# Patient Record
Sex: FEMALE | Race: WHITE | NOT HISPANIC OR LATINO
[De-identification: names, ages, dates, MRNs, and addresses within clinical notes are randomized per-mention and may not be internally consistent; named-entity substitution may affect disease eponyms.]

---

## 2019-03-29 PROBLEM — Z00.00 ENCOUNTER FOR PREVENTIVE HEALTH EXAMINATION: Status: ACTIVE | Noted: 2019-03-29

## 2019-04-01 ENCOUNTER — APPOINTMENT (OUTPATIENT)
Dept: OTOLARYNGOLOGY | Facility: CLINIC | Age: 44
End: 2019-04-01
Payer: COMMERCIAL

## 2019-04-01 VITALS
DIASTOLIC BLOOD PRESSURE: 80 MMHG | HEIGHT: 64 IN | SYSTOLIC BLOOD PRESSURE: 130 MMHG | WEIGHT: 130 LBS | HEART RATE: 75 BPM | BODY MASS INDEX: 22.2 KG/M2

## 2019-04-01 DIAGNOSIS — Z87.891 PERSONAL HISTORY OF NICOTINE DEPENDENCE: ICD-10-CM

## 2019-04-01 DIAGNOSIS — Z86.39 PERSONAL HISTORY OF OTHER ENDOCRINE, NUTRITIONAL AND METABOLIC DISEASE: ICD-10-CM

## 2019-04-01 DIAGNOSIS — Z91.09 OTHER ALLERGY STATUS, OTHER THAN TO DRUGS AND BIOLOGICAL SUBSTANCES: ICD-10-CM

## 2019-04-01 DIAGNOSIS — Z87.19 PERSONAL HISTORY OF OTHER DISEASES OF THE DIGESTIVE SYSTEM: ICD-10-CM

## 2019-04-01 DIAGNOSIS — Z98.890 OTHER SPECIFIED POSTPROCEDURAL STATES: ICD-10-CM

## 2019-04-01 DIAGNOSIS — Z78.9 OTHER SPECIFIED HEALTH STATUS: ICD-10-CM

## 2019-04-01 DIAGNOSIS — Z85.850 PERSONAL HISTORY OF MALIGNANT NEOPLASM OF THYROID: ICD-10-CM

## 2019-04-01 PROCEDURE — 99024 POSTOP FOLLOW-UP VISIT: CPT

## 2019-05-29 ENCOUNTER — APPOINTMENT (OUTPATIENT)
Dept: OTOLARYNGOLOGY | Facility: CLINIC | Age: 44
End: 2019-05-29
Payer: COMMERCIAL

## 2019-05-29 DIAGNOSIS — Z87.09 PERSONAL HISTORY OF OTHER DISEASES OF THE RESPIRATORY SYSTEM: ICD-10-CM

## 2019-05-29 PROCEDURE — 99213 OFFICE O/P EST LOW 20 MIN: CPT

## 2019-05-29 NOTE — REVIEW OF SYSTEMS
[Post Nasal Drip] : post nasal drip [Sneezing] : sneezing [Recurrent Sinus Infections] : recurrent sinus infections [Throat Clearing] : throat clearing [Cough] : cough [Negative] : Respiratory

## 2019-05-29 NOTE — HISTORY OF PRESENT ILLNESS
[de-identified] : TEO SEXTON is a 44 year old female who comes in complaining of 3 days of a cough and mucous in her throat and a sore throat and nasal congestion without fever. She has a history of sinusitis and environmental allergies. She is using antihistamines and Flonase for the latter.The patient had no other ear nose or throat complaints at this visit.

## 2019-05-29 NOTE — PHYSICAL EXAM
[FreeTextEntry1] : General:\par The patient was alert and oriented and in no distress.\par Voice was clear. She had intermittent brassy cough\par \par Ears:\par The external ears were normal without deformity.\par The ear canals were clear.\par The tympanic membranes were intact and normal.\par \par Neck: \par The neck was symmetrical.\par The parotid and submandibular glands were normal without masses.\par The trachea was midline and there was no unusual crepitus.\par The thyroid was smooth and nontender and no masses were palpated.\par There was no significant cervical adenopathy.\par \par Face:\par The patient had no facial asymmetry or mass.\par The skin was unremarkable.\par \par Oral cavity:\par The oral mucosa was normal.\par The oral and base of tongue were clear and without mass.\par The gingival and buccal mucosa were moist and without lesions.\par The palate moved well.\par There was no cleft to the palate.\par There appeared to be good salivary flow.  \par There was no pus, erythema or mass in the oral cavity.\par The posterior pharynx was inflamed and a rapid strep and regular throat culture were taken.\par \par The rapid strep was negative\par \par \par Nasal endoscopy:\par \par Nasal endoscopy was done with topical anesthesia and a flexible endoscope to evaluate for nasal polyps, chronic sinusitis and response to therapy.\par Evaluation showed that the septum was midline.  There was no significant mucosal disease.  The inferior turbinates and middle turbinates were normal.  On both sides, the surgically opened ethmoids, antra, and frontal recesses were clear.  There was no evidence of polypoid recurrences and no purulence.  The mucosa was close to stage zero.  The nasopharynx was benign.    The middle and  superior meati were normal and the sphenoethmoidal recesses were without evidence of disease\par There was inflammation of the nasal and sinus mucosa.\par \par Flexible fiberoptic laryngoscopy: [facility] [id number]\par Indications: Dysphagia\par Procedure note:\par \par Flexible fiberoptic laryngoscopy was performed because of dysphagia and because of the patient's inability to tolerate adequate mirror examination.\par \par The nasal cavity was anesthetized with Pontocaine and Afrin.\par The flexible endoscope was placed into the patient's nasal cavity.\par The nasopharynx was without masses.\par The oropharynx and base of tongue had no masses.\par The epiglottis and false vocal cords were normal.\par The pyriform sinuses were without mucosal lesions or pooling of secretions.  \par The lateral pharyngeal walls were clear and symmetrical.\par The vocal folds were clear and mobile; they abducted and adducted normally.\par There was no interarytenoid mass or fullness.\par \par There was posterior pharyngeal inflammation extending to the larynx without supraglottitis

## 2019-05-29 NOTE — ASSESSMENT
[FreeTextEntry1] : It was my impression that she has a probable acute viral upper respiratory tract infection. I recommend guaifenesin for the thick mucus and supportive care. The rapid strep was negative and I will send out the permanent culture.  I would treat if she develops a secondary bacterial infection or if the culture so indicates.

## 2019-06-27 ENCOUNTER — APPOINTMENT (OUTPATIENT)
Dept: OTOLARYNGOLOGY | Facility: CLINIC | Age: 44
End: 2019-06-27
Payer: COMMERCIAL

## 2019-06-27 PROCEDURE — 99212 OFFICE O/P EST SF 10 MIN: CPT

## 2019-06-27 RX ORDER — LEVOTHYROXINE SODIUM 137 UG/1
137 TABLET ORAL
Refills: 0 | Status: ACTIVE | COMMUNITY

## 2019-06-27 RX ORDER — LORATADINE 5 MG/5 ML
10 SOLUTION, ORAL ORAL
Refills: 0 | Status: ACTIVE | COMMUNITY

## 2019-06-27 RX ORDER — LANSOPRAZOLE 15 MG/1
15 CAPSULE, DELAYED RELEASE ORAL
Refills: 0 | Status: ACTIVE | COMMUNITY

## 2019-06-27 NOTE — HISTORY OF PRESENT ILLNESS
[de-identified] : Status post 2 Kenalog injections to the nasal tip scarring after nasal fracture repair and nasal valve repair. She still sees very slight fullness.  Her breathing is significantly improved. She is requesting another Kenalog injection

## 2019-06-27 NOTE — ASSESSMENT
[FreeTextEntry1] : Did well with Kenalog injection in the past. The fullness is extremely minimal at this point. This will likely be are last injection. Follow up as needed

## 2019-10-29 ENCOUNTER — APPOINTMENT (OUTPATIENT)
Dept: OTOLARYNGOLOGY | Facility: CLINIC | Age: 44
End: 2019-10-29
Payer: COMMERCIAL

## 2019-10-29 VITALS
SYSTOLIC BLOOD PRESSURE: 138 MMHG | HEART RATE: 76 BPM | WEIGHT: 130 LBS | HEIGHT: 64 IN | DIASTOLIC BLOOD PRESSURE: 94 MMHG | BODY MASS INDEX: 22.2 KG/M2

## 2019-10-29 PROCEDURE — 99213 OFFICE O/P EST LOW 20 MIN: CPT | Mod: 25

## 2019-10-29 PROCEDURE — 31231 NASAL ENDOSCOPY DX: CPT

## 2019-10-29 NOTE — ASSESSMENT
[FreeTextEntry1] : It was my impression that she has the persistent rhinitis and inferior turbinate hypertrophy through appropriate medical and allergy care. I suggested 4 days of prednisone at 20 mg and switching to budesonide rinses from the Flonase. I was asked to see her back in followup in a month and if not improving would  consider inferior turbinate reduction. She had this done in the past with good but not permanent the results.\par She wanted to ask Dr. Weiss about retesting for her allergies\par She still has laryngeal and nasopharyngeal evidence of reflux, but I did not increase her medical intervention, other than being careful of her diet.

## 2019-10-29 NOTE — PHYSICAL EXAM
[FreeTextEntry1] : \par The patient was alert and oriented and in no distress.\par Voice was clear.\par \par Face:\par The patient had no facial asymmetry or mass.\par The skin was unremarkable.\par \par Eyes:\par The pupils were equal round and reactive to light and accommodation.\par There was no significant nystagmus or disconjugate gaze noted.\par \par Nose: \par The external nose had no significant deformity.  There was no facial tenderness.  On anterior rhinoscopy, the nasal mucosa was clear.  The anterior septum was midline.  There were no visualized polyps purulence  or masses.\par \par Oral cavity:\par The oral mucosa was normal.\par The oral and base of tongue were clear and without mass.\par The gingival and buccal mucosa were moist and without lesions.\par The palate moved well.\par There was no cleft to the palate.\par There appeared to be good salivary flow.  \par There was no pus, erythema or mass in the oral cavity.\par \par \par Ears:\par The external ears were normal without deformity.\par The ear canals were clear.\par The tympanic membranes were intact and normal.\par \par Neck: \par The neck was symmetrical.\par The parotid and submandibular glands were normal without masses.\par The trachea was midline and there was no unusual crepitus.\par The thyroid was smooth and nontender and no masses were palpated.\par There was no significant cervical adenopathy.\par \par \par Neuro:\par Neurologically, the patient was awake, alert, and oriented to person, place and time. There were no obvious focal neurologic abnormalities.  Cranial nerves II through XII were grossly intact.\par \par \par TMJ:\par The temporomandibular joints were nontender.\par There was no abnormal crepitus and no significant malocclusion\par  [de-identified] : Nasal endoscopy: \par CPT 63793\par Procedure Note:\par \par Nasal endoscopy was done with topical anesthesia of Pontocaine and Afrin and a      nasal endoscope.\par Indication: Nasal congestion, rule out sinusitis.\par Procedure: The nasal cavity was anesthetized with topical Afrin and Pontocaine. An  endoscope was used and inserted into the nasal cavity.\par Attention was first paid to the anterior nasal cavity.\par Endocoscopy was performed to inspect the interior of the nasal cavity, the nasal septum,  the middle and superior meati, the inferior, middle and superior turbinates, and the spheno-ethmoidal  recesses, the nasopharynx and eustachian tube orifices bilaterally. \par All findings were normal except:\par She had moderate hypertrophy of the right inferior turbinate and the middle meatus was clear without polyps purulence or masses.\par She appeared to have a large or bifid left middle turbinates without polyps purulence or masses. She had significant left-sided inferior turbinate hypertrophy with the inferior turbinate abutting the septum posteriorly.\par \par Flexible fiberoptic laryngoscopy: CPT 22258\par Indications: Dysphagia\par Procedure note:\par \par Flexible fiberoptic laryngoscopy was performed because of dysphagia and because of the patient's inability to tolerate adequate mirror examination.\par \par The nasal cavity was anesthetized with Pontocaine and Afrin.\par The flexible endoscope was placed into the patient's nasal cavity.\par The nasopharynx was without masses.\par The oropharynx, vallecula and base of tongue had no masses.\par The epiglottis, aryepiglottic folds and false vocal cords were normal.\par The pyriform sinuses were without mucosal lesions or pooling of secretions.  \par The laryngeal ventricles were without lesions.\par The visualized subglottis was without mass.\par The lateral and posterior pharyngeal walls were clear and symmetrical.\par The vocal folds were clear and mobile; they abducted and adducted normally.\par There was no interarytenoid mass or fullness.\par There was moderate and a laryngeal inflammation and inflammation of the nasopharynx\par

## 2019-10-29 NOTE — CONSULT LETTER
[FreeTextEntry2] : Buster Weiss MD [FreeTextEntry1] : \par \par Dear  Buster\par \par I had the pleasure of seeing your patient today.  \par Please see my note below.\par \par \par Thank you very much for allowing me to participate in the care of your patient.\par \par Sincerely,\par Lane\par \par Shant Rachel MD\par [facility]\par [facility]\par  [facility]\par \par

## 2019-10-29 NOTE — HISTORY OF PRESENT ILLNESS
[de-identified] : TEO SEXTON is a 44 year old female who comes in complaining of sinus pressure and a possible sinus infection. She notes that she has been feeling tightness in her nasal cavity for a few weeks and pressure over her forehead.  She had a sinus infection over the summer.  She has been getting desensitization. She is using Prevacid Flonase and Claritin. She still has some throat clearing. She has not had purulence.The patient had no other ear nose or throat complaints at this visit.

## 2019-12-30 ENCOUNTER — APPOINTMENT (OUTPATIENT)
Dept: OTOLARYNGOLOGY | Facility: CLINIC | Age: 44
End: 2019-12-30
Payer: COMMERCIAL

## 2019-12-30 PROCEDURE — 99213 OFFICE O/P EST LOW 20 MIN: CPT

## 2019-12-30 NOTE — HISTORY OF PRESENT ILLNESS
[de-identified] : Status post 3 Kenalog injections to the nasal tip scarring after nasal fracture repair and nasal valve repair, last in June 2019.  She still sees very slight fullness.  Her breathing is significantly improved, and she is also on dupixent. She is requesting another Kenalog injection

## 2020-09-16 ENCOUNTER — TRANSCRIPTION ENCOUNTER (OUTPATIENT)
Age: 45
End: 2020-09-16

## 2020-09-16 ENCOUNTER — APPOINTMENT (OUTPATIENT)
Dept: OTOLARYNGOLOGY | Facility: CLINIC | Age: 45
End: 2020-09-16
Payer: COMMERCIAL

## 2020-09-16 VITALS — WEIGHT: 140 LBS | HEIGHT: 64 IN | TEMPERATURE: 98.1 F | BODY MASS INDEX: 23.9 KG/M2

## 2020-09-16 PROCEDURE — 99213 OFFICE O/P EST LOW 20 MIN: CPT | Mod: 25

## 2020-09-16 PROCEDURE — 31231 NASAL ENDOSCOPY DX: CPT

## 2020-09-16 RX ORDER — PREDNISONE 20 MG/1
20 TABLET ORAL DAILY
Qty: 4 | Refills: 0 | Status: DISCONTINUED | COMMUNITY
Start: 2019-10-29 | End: 2020-09-16

## 2020-09-16 NOTE — ASSESSMENT
[FreeTextEntry1] : It was my impression that her nasal congestion was likely multifactorial.\par Unfortunately, her allergy care was interrupted and I suggested going back to this.\par She has inferior turbinate hypertrophy and did well for quite a few years from inferior turbinate reductions. However, some of her congestion seems likely from the nasal valve as well.\par If she does not do well enough, I would recommend either inferior turbinate reductions or inferior turbinate reductions combined with vivaer to treat the nasal valve \par She has a positive Rc on the left\par Her sinuses look excellent. There is no evidence of recurrent polypoid disease\par Some of the irritation may be from the nasal steroid spray and I suggested switching from Flonase to a water based spray

## 2020-09-16 NOTE — CONSULT LETTER
[FreeTextEntry2] : Buster Weiss MD [FreeTextEntry1] : \par \par Dear  Buster\par \par I had the pleasure of seeing your patient today.  \par Please see my note below.\par \par \par Thank you very much for allowing me to participate in the care of your patient.\par \par I hope this note finds you and your family are well.   \par \par Sincerely,\par Laen\par \par Shant Rachel MD\par [facility]\par [facility]\par  [facility]\par \par

## 2020-09-16 NOTE — HISTORY OF PRESENT ILLNESS
[de-identified] : TEO SEXTON is a 45 year old female who comes in complaining of Increased nasal congestion. This is worse on the left side and she was told that she has inferior turbinate hypertrophy. Her allergy care has been interrupted and she feels this may be part of the problem. She denies any significant purulence but the tissue feels sort of angry. She had used Dupixent in the past for some time but this also was interrupted as her allergy shots.The patient had no other ear nose or throat complaints at this visit.

## 2020-09-16 NOTE — PHYSICAL EXAM
[FreeTextEntry1] : \par The patient was alert and oriented and in no distress.\par Voice was clear.\par \par Face:\par The patient had no facial asymmetry or mass.\par The skin was unremarkable.\par \par Eyes:\par The pupils were equal round and reactive to light and accommodation.\par There was no significant nystagmus or disconjugate gaze noted.\par \par Nose: \par The external nose had no significant deformity.  There was no facial tenderness.  On anterior rhinoscopy, the nasal mucosa was clear.  The anterior septum was midline.  \par \par Oral cavity:\par The oral mucosa was normal.\par The oral and base of tongue were clear and without mass.\par The gingival and buccal mucosa were moist and without lesions.\par The palate moved well.\par There was no cleft to the palate.\par There appeared to be good salivary flow.  \par There was no pus, erythema or mass in the oral cavity.\par \par \par Ears:\par The external ears were normal without deformity.\par The ear canals were clear.\par The tympanic membranes were intact and normal.\par \par Neck: \par The neck was symmetrical.\par The parotid and submandibular glands were normal without masses.\par The trachea was midline and there was no unusual crepitus.\par The thyroid was smooth and nontender and no masses were palpated.\par There was no significant cervical adenopathy.\par \par \par Neuro:\par Neurologically, the patient was awake, alert, and oriented to person, place and time. There were no obvious focal neurologic abnormalities.  Cranial nerves II through XII were grossly intact.\par \par \par TMJ:\par The temporomandibular joints were nontender.\par There was no abnormal crepitus and no significant malocclusion\par \par  [de-identified] : Nasal endoscopy:\par \par Nasal endoscopy was done with topical anesthesia and a flexible endoscope to evaluate for nasal polyps, chronic sinusitis and response to therapy.\par Endocoscopy was performed to inspect the interior of the nasal cavity, the nasal septum,  the middle and superior meati, the inferior, middle and superior turbinates, and the spheno-ethmoidal  recesses, the nasopharynx and eustachian tube orifices bilaterally\par \par Endoscopy was done with Covid precautions and with video. All risks and benefits were discussed with the patient and consent obtained.\par \par \par Evaluation showed that the septum was midline.  There was no significant mucosal disease.  The inferior turbinates and middle turbinates were normal.  On both sides, the surgically opened ethmoids, antra, and frontal recesses were clear.  There was no evidence of polypoid recurrences and no purulence.  The mucosa was close to stage zero.  The nasopharynx was benign.    The middle and  superior meati were normal and the sphenoethmoidal recesses were without evidence of disease\par \par She had boggy inferior turbinates bilaterally and fullness of the nasal valve more on the left than right with positive Rc maneuver

## 2020-09-26 ENCOUNTER — LABORATORY RESULT (OUTPATIENT)
Age: 45
End: 2020-09-26

## 2020-09-30 ENCOUNTER — APPOINTMENT (OUTPATIENT)
Dept: OTOLARYNGOLOGY | Facility: CLINIC | Age: 45
End: 2020-09-30
Payer: COMMERCIAL

## 2020-09-30 VITALS — WEIGHT: 140 LBS | BODY MASS INDEX: 23.9 KG/M2 | HEIGHT: 64 IN | TEMPERATURE: 97.7 F

## 2020-09-30 PROCEDURE — 30117 REMOVAL OF INTRANASAL LESION: CPT

## 2020-09-30 PROCEDURE — 30801 ABLATE INF TURBINATE SUPERF: CPT | Mod: 59

## 2020-09-30 PROCEDURE — 31237 NSL/SINS NDSC SURG BX POLYPC: CPT | Mod: 50

## 2020-09-30 NOTE — PHYSICAL EXAM
[FreeTextEntry1] : Preoperative diagnosis: Inferior turbinate hypertrophy, nasal swell body hypertrophy and upper lateral valve collapse\par Postoperative diagnosis: Same\par Procedure:  vivaer for reduction of the nasal septal swell body, nasal valves and inferior turbinates\par [id number],  [id number].,[id number]\par Surgeon: Wilson\par \par The nasal cavity was anesthetized with topical tetracaine, lidocaine gel and then 1% lidocaine with epinephrine one 100,000\par Reevaluation showed the above findings.\par \par Surgical endoscopy was done and 2 lesions were created on each inferior turbinate the nasal valve and the septal swell bodies bilaterally without complication\par \par The patient tolerated  well and will return in 10 days to 2 weeks for repeat evaluation [de-identified] : PRE-OP DIAGNOSES: [INSERT diagnoses description and code]ICD-10 Diagnosis Codes?Primary FhpgzoaupoM32.81 (nasal congestion)oJ34.89 (other disorders of the nose) oJ34.9 (unspecified disorders of the nose or sinuses)oJ34.3 (turbinate hypertrophy)PROCEDURES PERFORMED: [INSERT procedure description and code]1.Destruction of intranasal tissue by radiofrequency, internal approach ([id number])2.Destruction of intranasal tissue by radiofrequency, internal approach ([id number])3.Ablation,   soft   tissue   of   inferior   turbinates,   unilateral   or   bilateral,  byradiofrequency; superficial (CPT 96666)ANESTHESIA:Example: Spray  1:1 Oxymetazoline and 4% lidocaine (5-10 min); Soaked cottonballs with 1:1 4% Lidocaine and 1:100,000 Epinephrine (10-15 Minutes); Inject 1cc1% Lidocaine at the caudal border of the upper lateral cartilage, along the medialedge of the inferior turbinate, and at the superior portion of the nasal septum. (10minutes)COMPLICATIONS:NoneESTIMATED BLOOD LOSS:NoneSPECIMENS:NoneINDICATIONS FOR PROCEDURE:The patient is a XX year old male/female. Symptoms consistent with submucosalnodular   swelling   (swell   bodies)   of   the   septum   and   turbinate   hypertrophycontributing to nasal airway obstruction. After discussing the risks, benefits andalternatives to nasal airway treatment, the patient agreed to proceed with thetreatment.[id number].A\par DESCRIPTION OF OPERATION:After obtaining informed consent from the patient,  he/she  was brought to theprocedure room and seated on the procedure chair in the seated reclined position.Two methods were used to verify patient identity.  A time-out was taken as perprotocol.  Topical decongestant was applied bilaterally intranasally.  A  XX  degreeXmm rigid endoscope was used to examine the left and right nasal cavities. Thenasal   septum,   inferior   turbinates,   and   nasal   valve   areas   were   examined   forabnormalities.Starting with the  left/right  side,  soaked cotton balls with 1:1 4% Lidocaine and1:100,000 Epinephrine (10-15 Minutes) were placed in the nasal cavity adjacent to the inferior turbinates, nasal septum, and nasal lateral wall. These were left in placefor XX minutes and then removed without difficulty and disposed appropriately. Onecc of 1% Lidocaine solution was then injected at the caudal border of the upperlateral cartilage, along the medial edge of the inferior turbinate, and at the superiorportion of the nasal septum.Once appropriate anesthesia was confirmed, the radiofrequency stylus was preppedwith conductive media. The stylus was advanced into the left nostril and placed on the middle portion of the caudal edge of the upper lateral cartilage. While exertingoutward lateral pressure, energy was then applied to the mid valve region, then to the upper valve region, then to the lower valve region. Energy was applied at eachof the three sites to modify abnormal tissue that was obstructing airflow. The  radiofrequency stylus was removed from the nostril and inspected and re-prepped. The radiofrequency stylus was then inserted back into the nostril andplaced against the anterior superior septal region in the area where the submucosalnodular swelling was located. Medial pressure was exerted while energy wasapplied. Energy was applied at XX sites to destruct the nasal septal lesions.The radiofrequency stylus was then removed from the nostril, inspected, and re-prepped. The radiofrequency stylus was then inserted back into the nostril andplaced  against the  medial  surface  of  the left inferior turbinate. Using lateraldeflection against the treatment zone, the radiofrequency stylus was activated toablate the hypertrophic turbinate tissue. The radiofrequency stylus was applied twomore times in adjacent, non-overlapping locations along the inferior turbinate foreach treatment cycle.Attention was then turned to the contralateral side and anesthesia was confirmed.The radiofrequency stylus was inspected, re-prepped, and the procedure performedon the left side was repeated. The stylus was advanced into the right nostril and placed on the middle portion of the caudal edge of the upper lateral cartilage. While exerting outward lateralpressure, energy was then applied to the mid valve region, then to the upper valveregion, then to the lower valve region. Energy was applied at each of the three sitesto modify abnormal tissue that was obstructing airflow. The  radiofrequency stylus was removed from the nostril and inspected and re-[id number].A\par prepped. The radiofrequency stylus was then inserted back into the nostril andplaced against the anterior superior septal region in the area where the submucosalnodular swelling was located. Medial pressure was exerted while energy wasapplied. Energy was applied at XX sites to destruct the nasal septal lesions.The radiofrequency stylus was then removed from the nostril, inspected, and re-prepped. The radiofrequency stylus was then inserted back into the nostril andplaced  against the  medial  surface  of  the left inferior turbinate. Using lateraldeflection against the treatment zone, the radiofrequency stylus was activated toablate the hypertrophic turbinate tissue. The radiofrequency stylus was applied twomore times in adjacent, non-overlapping locations along the inferior turbinate foreach treatment cycle.The patient tolerated the procedures without any complications.After adequaterecovery time, a post-procedure assessment was completed and the patient wasreturned safely to ambulatory status as per protocol. Post-procedure instructionswere provided to patient’s surrogate and the patient; these were understood and acopy provided prior to discharge. Patient to return to clinic in XX days or sooner asneeded.[id number]

## 2020-09-30 NOTE — HISTORY OF PRESENT ILLNESS
[de-identified] : Was seen on September 30. She has the history of the significant allergies and nasal congestion. She has done well in the past with inferior turbinate reductions and notes that her nasal airway is getting worse in spite of medical care.\par Previous evaluation had shown nasal septal swell body congestion and some upper lateral valve collapse as well as the inferior turbinate hypertrophy. It was felt that the patient would benefit from reduction with electrofrequency Vivaer.  All risks and benefits including the possibility that this may not help her airway or that the swelling could recur and the possibility of scarring were discussed with the patient understood and agreed
EKG/Imaging Studies

## 2020-10-16 ENCOUNTER — APPOINTMENT (OUTPATIENT)
Dept: OTOLARYNGOLOGY | Facility: CLINIC | Age: 45
End: 2020-10-16
Payer: COMMERCIAL

## 2020-10-16 VITALS — WEIGHT: 140 LBS | BODY MASS INDEX: 23.9 KG/M2 | HEIGHT: 64 IN | TEMPERATURE: 97.2 F

## 2020-10-16 PROCEDURE — 99024 POSTOP FOLLOW-UP VISIT: CPT

## 2020-10-16 NOTE — ASSESSMENT
[FreeTextEntry1] : It was my impression that she was having an excellent result. I recommended going back to fluticasone and following up with Dr. Weiss for her allergy care and would like to reevaluate again in about 2-3 weeks

## 2020-10-16 NOTE — PHYSICAL EXAM
[FreeTextEntry1] : Pertinent findings related to nasal endoscopy. There still was some inflammation on the inferior turbinates, bilaterally and crusting was removed from the right. There was hyperemia of the area of the upper laterals that she was feeling well in the airway looks improved and there were no complications

## 2020-10-16 NOTE — CONSULT LETTER
[FreeTextEntry2] : Buster Weiss MD [FreeTextEntry1] : \par \par Dear  Dr. Weiss\par \par I had the pleasure of seeing your patient today.  \par Please see my note below.\par \par \par Thank you very much for allowing me to participate in the care of your patient.\par \par Sincerely,\par \par \par Lane\par \par Shant Rachel MD\par [facility]\par [facility]\par  Health system\par \par

## 2020-10-16 NOTE — HISTORY OF PRESENT ILLNESS
[de-identified] : TEO SEXTON was seen in followup on October 16.She is 2 weeks status post Vivaer and notes improvement.  She had a little discomfort only and some crusting and she feels the airway is improved.\par The patient had no other ear nose or throat complaints at this visit.

## 2020-12-21 PROBLEM — Z87.09 HISTORY OF UPPER RESPIRATORY INFECTION: Status: RESOLVED | Noted: 2019-05-29 | Resolved: 2020-12-21

## 2021-05-17 ENCOUNTER — APPOINTMENT (OUTPATIENT)
Dept: OTOLARYNGOLOGY | Facility: CLINIC | Age: 46
End: 2021-05-17
Payer: COMMERCIAL

## 2021-05-17 VITALS — BODY MASS INDEX: 27.31 KG/M2 | HEIGHT: 64 IN | WEIGHT: 160 LBS | TEMPERATURE: 97.9 F

## 2021-05-17 PROCEDURE — 99072 ADDL SUPL MATRL&STAF TM PHE: CPT

## 2021-05-17 PROCEDURE — 31231 NASAL ENDOSCOPY DX: CPT

## 2021-05-17 PROCEDURE — 99214 OFFICE O/P EST MOD 30 MIN: CPT | Mod: 25

## 2021-05-17 NOTE — PHYSICAL EXAM
[FreeTextEntry1] : \par The patient was alert and oriented and in no distress.\par Voice was clear.\par \par Face:\par The patient had no facial asymmetry or mass.\par The skin was unremarkable.\par \par Eyes:\par The pupils were equal round and reactive to light and accommodation.\par There was no significant nystagmus or disconjugate gaze noted.\par \par Nose: \par The external nose had no significant deformity.  There was no facial tenderness.  On anterior rhinoscopy, the nasal mucosa was clear.  The anterior septum was midline.  There were no visualized polyps purulence  or masses.\par \par Oral cavity:\par The oral mucosa was normal.\par The oral and base of tongue were clear and without mass.\par The gingival and buccal mucosa were moist and without lesions.\par The palate moved well.\par There was no cleft to the palate.\par There appeared to be good salivary flow.  \par There was no pus, erythema or mass in the oral cavity.\par \par \par Ears:\par The external ears were normal without deformity.\par The ear canals were clear.\par The tympanic membranes were intact and normal.\par \par Neck: \par The neck was symmetrical.\par The parotid and submandibular glands were normal without masses.\par The trachea was midline and there was no unusual crepitus.\par The thyroid was smooth and nontender and no masses were palpated.\par There was no significant cervical adenopathy.\par \par \par Neuro:\par Neurologically, the patient was awake, alert, and oriented to person, place and time. There were no obvious focal neurologic abnormalities.  Cranial nerves II through XII were grossly intact.\par \par \par TMJ:\par The temporomandibular joints were nontender.\par There was no abnormal crepitus and no significant malocclusion\par \par She gained some weight since I last saw her [de-identified] : Nasal endoscopy: \par \par Endoscopy was done with Covid precautions and with video. All risks and benefits were discussed with the patient and consent obtained.\par \par Procedure Note:\par \par Nasal endoscopy was done with topical anesthesia and a fiberoptic endoscope.\par Indication: Nasal congestion, rule out sinusitis.\par Procedure: The nasal cavity was anesthetized with topical Afrin and Pontocaine. An  endoscope was used and inserted into the nasal cavity. \par Endocoscopy was performed to inspect the interior of the nasal cavity, the nasal septum,  the middle and superior meati, the inferior, middle and superior turbinates, and the spheno-ethmoidal  recesses, the nasopharynx and eustachian tube orifices bilaterally\par  This showed that the nasal mucosa was slightly boggy.  There was a moderate S-shaped septal deflection.  However, the middle meati were open.  There were no polyps, purulence or masses.  The eustachian tube orifices were clear.  The nasopharynx was benign and without mass.  The inferior and middle turbinates were slightly boggy, the superior meati were normal and the sphenoethmoidal recesses were without evidence of disease.\par \par The nasal mucosa and inferior turbinates look improved and she no longer had nasal valve collapse\par \par Flexible fiberoptic laryngoscopy: [facility] [address]\par Indications: Dysphagia\par Procedure note:\par  \par Flexible fiberoptic laryngoscopy was performed because of dysphagia and the patient's inability to tolerate adequate mirror examination.\par The nasal cavity was anesthetized with Pontocaine plus Afrin.\par \par \par The nasal cavity was anesthetized with Pontocaine and Afrin.\par The flexible endoscope was placed into the patient's nasal cavity.\par The nasopharynx was without masses.\par The oropharynx, vallecula and base of tongue had no masses.\par The epiglottis, aryepiglottic folds and false vocal cords were normal.\par The pyriform sinuses were without mucosal lesions or pooling of secretions.  \par The laryngeal ventricles were without lesions.\par The visualized subglottis was without mass.\par The lateral and posterior pharyngeal walls were clear and symmetrical.\par The vocal folds were clear and mobile; they abducted and adducted normally.\par There was no interarytenoid mass or fullness.\par There was significant posterior laryngeal inflammation consistent with reflux.\par \par Endoscopy was done with Covid precautions and with video. All risks and benefits were discussed with the patient and consent obtained.

## 2021-05-17 NOTE — CONSULT LETTER
[FreeTextEntry2] : REYNALDO HUSAIN\par  [FreeTextEntry1] : \par \par Dear  Dr. REYNALDO HUSAIN,\par \par I had the pleasure of seeing your patient today.  \par Please see my note below.\par \par \par Thank you very much for allowing me to participate in the care of your patient.\par \par Sincerely,\par \par \par Shant Rachel MD\par [facility]\par [facility]\par  [facility]\par \par  [DrReyna  ___] : Dr. MCDANIELS

## 2021-05-17 NOTE — ASSESSMENT
[FreeTextEntry1] : It was my impression that she had done quite well from the Vivaer treatment of her inferior turbinate hypertrophy and nasal valves. She has no evidence of nasal or sinus disease at this time but I did suggest continuing with Flonase.\par She has laryngeal findings consistent with reflux. Several years ago, [employer] pH testing showed a 2 hours and 2 minutes of recumbent reflux to the nasopharynx but minimal reflux while upright.\par As she is taking lansoprazole 15 mg before breakfast, I don't believe that her nighttime reflux is being well treated.  I again recommended an antireflux diet and reviewed this with her. I suggest elevation of the head of the bed with a wedge rather than an extra pillow and I suggested, at least at first, adding 40 mg of famotidine at bedtime.\par I strongly suggest repeating her upper GI evaluation.\par I would like to see her back in followup in 6 months or earlier if needed.\par I asked her to speak to Dr. Vanegas and would like to get a copy of her endoscopy report.

## 2021-05-17 NOTE — HISTORY OF PRESENT ILLNESS
[facility]    [address] [address]    Phone:  [phone]                                       Meladina R Jeffries   MRN: [id number]    Department:  [facility]   Date of Visit:  11/18/2018           Patient Information     Date Of Birth          2009        Your diagnoses for this visit were:     Enterobiasis        You were seen by Rizwana Rossi NP.      Follow-up Information     Follow up with [facility].    Specialty:  EMERGENCY MEDICINE    Why:  As needed, If symptoms worsen    Contact information:    [address] [address] [phone]    Additional information:    From [place]: Take [address]. Turn left at [address]. Turn left at the first stoplight on [address] [place] [address]. At the first stop sign, take a right onto [address]. Take a left into the parking lot and continue through until you reach the North enterance of the building.       From [place]: Take [address]. Take the [address] ramp towards [place]. Turn left onto [address]. Take a slight right onto [address]. Turn left at the first stoplight on [address]. At the first stop sign, take a right onto [address]. Take a left into the parking lot and continue through until you reach the North enterance of the building.       From [place]: Take [address]. Take a right at [address]. At the first stop sign, take a right onto [address]. Take a left into the parking lot and continue through until you reach the North enterance of the building.         Discharge Instructions       Give Mebendazole as ordered as directed.   Wash bedding and clothing in hot water and dry with hot heat.   Clip finger nails short.   Good hand washing.   Give benadryl for anal itching.   Return to urgent care or emergency department with any increase in symptoms or concerns.     Discharge  References/Attachments     PINWORMS (ENGLISH)    PINWORMS, WHEN YOUR CHILD HAS (ENGLISH)      ED Discharge Orders     Pinworm exam                    Review of your medicines      START taking        Dose / Directions Last dose taken    mebendazole 100 MG chewable tablet   Commonly known as:  VERMOX   Dose:  100 mg   Quantity:  1 tablet        Take 1 tablet (100 mg) by mouth once for 1 dose   Refills:  0                Prescriptions were sent or printed at these locations (1 Prescription)                   [facility] [address] [place] [address], [place] - [address] AT [facility] [address], [place] [address]    Telephone:  [phone]   Fax:  [phone]   Hours:                  E-Prescribed (1 of 1)         mebendazole (VERMOX) 100 MG chewable tablet                Orders Needing Specimen Collection     None      Pending Results     No orders found from 11/16/2018 to 11/19/2018.            Pending Culture Results     No orders found from 11/16/2018 to 11/19/2018.            Thank you for choosing [facility]       Thank you for choosing [facility] for your care. Our goal is always to provide you with excellent care. Hearing back from our patients is one way we can continue to improve our services. Please take a few minutes to complete the written survey that you may receive in the mail after you visit with us. Thank you!        RateElertharAnimated Speech Information     Backdoor lets you send messages to your doctor, view your test results, renew your prescriptions, schedule appointments and more. To sign up, go to [email or web address], contact your [facility] clinic or call [phone] during business hours.            Care EveryWhere ID     This is your Care EveryWhere ID. This could be used by other organizations to access your [facility] medical records  [id number]        Equal Access to Services     PAULA CHRISTENSEN AH: derek Perales qaybta kaalmada adeegyada, waxay idiin hayaan  olegario lewis ah. So [facility] [phone].    ATENCIÓN: Si habla español, tiene a blankenship disposición servicios gratuitos de asistencia lingüística. Llame al [phone].    We comply with applicable federal civil rights laws and [place] laws. We do not discriminate on the basis of race, color, national origin, age, disability, sex, sexual orientation, or gender identity.            After Visit Summary       This is your record. Keep this with you and show to your community pharmacist(s) and doctor(s) at your next visit.                   [de-identified] : TEO SEXTON is a 46 year old female who comes in complaining of A persistent dry cough. She had pulmonary evaluations showing course lung markings, And her evaluation was felt to be probably for reflux. She has gained about 25 pounds since I last saw her. She is taking Prevacid.\par Last September I had done Vivaer inferior turbinate reductions, treatment of the valve and septal swells and she feels that this has helped her airway significantly and without problems. She comes in for repeat evaluation

## 2021-11-03 ENCOUNTER — APPOINTMENT (OUTPATIENT)
Dept: OTOLARYNGOLOGY | Facility: CLINIC | Age: 46
End: 2021-11-03
Payer: COMMERCIAL

## 2021-11-03 PROCEDURE — 31231 NASAL ENDOSCOPY DX: CPT

## 2021-11-03 PROCEDURE — 99214 OFFICE O/P EST MOD 30 MIN: CPT | Mod: 25

## 2021-11-03 NOTE — PHYSICAL EXAM
[FreeTextEntry1] : \par The patient was alert and oriented and in no distress.\par Voice was clear.\par \par Face:\par The patient had no facial asymmetry or mass.\par The skin was unremarkable.\par \par Eyes:\par The pupils were equal round and reactive to light and accommodation.\par There was no significant nystagmus or disconjugate gaze noted.\par \par Nose: \par The external nose had no significant deformity.  There was no facial tenderness.  On anterior rhinoscopy, the nasal mucosa was clear.  The anterior septum was midline.  There were no visualized polyps purulence  or masses.\par \par Oral cavity:\par The oral mucosa was normal.\par The oral and base of tongue were clear and without mass.\par The gingival and buccal mucosa were moist and without lesions.\par The palate moved well.\par There was no cleft to the palate.\par There appeared to be good salivary flow.  \par There was no pus, erythema or mass in the oral cavity.\par \par \par Ears:\par The external ears were normal without deformity.\par The ear canals were clear.\par The tympanic membranes were intact and normal.\par \par Neck: \par The neck was symmetrical.\par The parotid and submandibular glands were normal without masses.\par The trachea was midline and there was no unusual crepitus.\par The thyroid was smooth and nontender and no masses were palpated.\par There was no significant cervical adenopathy.\par \par \par Neuro:\par Neurologically, the patient was awake, alert, and oriented to person, place and time. There were no obvious focal neurologic abnormalities.  Cranial nerves II through XII were grossly intact.\par \par \par TMJ:\par The temporomandibular joints were nontender.\par There was no abnormal crepitus and no significant malocclusion\par \par Nasal endoscopy: \par CPT 78092\par Procedure Note:\par \par Endoscopy was done with Covid precautions and with video. All risks and benefits were discussed with the patient and consent obtained.\par \par Nasal endoscopy was done with topical anesthesia of Pontocaine and Afrin and a      nasal endoscope.\par Indication: Nasal congestion, rule out sinusitis.\par Procedure: The nasal cavity was anesthetized with topical Afrin and Pontocaine. An  endoscope was used and inserted into the nasal cavity.\par Attention was first paid to the anterior nasal cavity.\par Endocoscopy was performed to inspect the interior of the nasal cavity, the nasal septum,  the middle and superior meati, the inferior, middle and superior turbinates, and the spheno-ethmoidal  recesses, the nasopharynx and eustachian tube orifices bilaterally. \par All findings were normal except:\par \par The sinuses were all widely patent and slightly congested but without polyps purulence or masses. There was an area of significant inflammation along the left lateral nasal wall anteriorly with fissuring.\par \par \par Flexible fiberoptic laryngoscopy: CPT 26515\par Indications: Dysphagia\par Procedure note:\par \par Flexible fiberoptic laryngoscopy was performed because of dysphagia and because of the patient's inability to tolerate adequate mirror examination.\par \par The nasal cavity was anesthetized with Pontocaine and Afrin.\par The flexible endoscope was placed into the patient's nasal cavity.\par The nasopharynx was without masses.\par The oropharynx, vallecula and base of tongue had no masses.\par The epiglottis, aryepiglottic folds and false vocal cords were normal.\par The pyriform sinuses were without mucosal lesions or pooling of secretions.  \par The laryngeal ventricles were without lesions.\par The visualized subglottis was without mass.\par The lateral and posterior pharyngeal walls were clear and symmetrical.\par The vocal folds were clear and mobile; they abducted and adducted normally.\par There was no interarytenoid mass or fullness.\par \par Endoscopy was done with Covid precautions and with video. All risks and benefits were discussed with the patient and consent obtained.\par She has moderate posterior laryngeal inflammation

## 2021-11-03 NOTE — ASSESSMENT
[FreeTextEntry1] : It was my impression that she has a significant mucositis of the left nasal valve anteriorly with crusting. Her sinuses were otherwise clear. This is likely environmental. 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 Parker if it was okay to switch the Prevacid to to 30-60 minutes before dinner\par \par I would like to reevaluate in 2-3 weeks to make sure this has healed.  If this persists I would plan biopsy

## 2021-11-03 NOTE — HISTORY OF PRESENT ILLNESS
[de-identified] : TEO SEXTON Was seen on November 3. Since I had seen her she had a colonoscopy that she tells me was normal but her upper GI endoscopy showed gastritis as well as some esophagitis.She was switched to 30 mg of Prevacid before breakfast. She comes in with complaints of left-sided nasal discomfort and is concerned that she may have a sinus infection. She has been under quite a bit of stress recently. There has been a leak in her roof and she is concerned about possible mold exposure.The patient had no other ear nose or throat complaints at this visit.

## 2021-11-03 NOTE — CONSULT LETTER
[FreeTextEntry2] : REYNALDO HUSAIN\par  [FreeTextEntry1] : \par \par Dear  Dr. REYNALDO HUSAIN,\par \par I had the pleasure of seeing your patient today.  \par Please see my note below.\par \par \par Thank you very much for allowing me to participate in the care of your patient.\par \par Sincerely,\par \par \par Shant Rachel MD\par [facility]\par [facility]\par  [facility]\par \par  [DrReyna  ___] : Dr. MCDANIELS

## 2023-07-20 ENCOUNTER — APPOINTMENT (OUTPATIENT)
Dept: OTOLARYNGOLOGY | Facility: CLINIC | Age: 48
End: 2023-07-20
Payer: COMMERCIAL

## 2023-07-20 VITALS — WEIGHT: 169 LBS | BODY MASS INDEX: 28.85 KG/M2 | HEIGHT: 64 IN

## 2023-07-20 PROCEDURE — 99214 OFFICE O/P EST MOD 30 MIN: CPT | Mod: 25

## 2023-07-20 PROCEDURE — 31231 NASAL ENDOSCOPY DX: CPT

## 2023-07-20 RX ORDER — FAMOTIDINE 40 MG/1
40 TABLET, FILM COATED ORAL
Qty: 30 | Refills: 5 | Status: COMPLETED | COMMUNITY
Start: 2021-05-17 | End: 2023-07-20

## 2023-07-20 RX ORDER — MUPIROCIN 20 MG/G
2 OINTMENT TOPICAL
Qty: 22 | Refills: 0 | Status: COMPLETED | COMMUNITY
Start: 2021-11-03 | End: 2023-07-20

## 2023-07-20 RX ORDER — VORTIOXETINE 20 MG/1
TABLET, FILM COATED ORAL
Refills: 0 | Status: COMPLETED | COMMUNITY
End: 2023-07-20

## 2023-07-20 RX ORDER — FLUTICASONE PROPIONATE 50 UG/1
50 SPRAY, METERED NASAL
Qty: 16 | Refills: 5 | Status: ACTIVE | COMMUNITY
Start: 2023-07-20 | End: 1900-01-01

## 2023-07-20 RX ORDER — FLUTICASONE PROPIONATE 50 MCG
50 SPRAY, SUSPENSION NASAL
Refills: 0 | Status: COMPLETED | COMMUNITY
End: 2023-07-20

## 2023-07-20 RX ORDER — BUDESONIDE 0.5 MG/2ML
0.5 INHALANT ORAL TWICE DAILY
Qty: 60 | Refills: 5 | Status: COMPLETED | COMMUNITY
Start: 2019-10-29 | End: 2023-07-20

## 2023-07-20 NOTE — HISTORY OF PRESENT ILLNESS
[de-identified] : TEO SEXTON is a 48 year old female who comes in complaining of left-sided nasal congestion.  She has not had purulence or felt like an infection and's been going on for some while now.  It turns out she did have significant mold exposure and her breathing problems got better when she left the environment.  She had gained some weight and her reflux may have gotten worse but in any case she was off medication and just recently went back to the Prevacid which is beginning to help.  The patient had no other ear nose or throat complaints at this visit.

## 2023-07-20 NOTE — PHYSICAL EXAM
[FreeTextEntry1] : General:\par The patient was alert and oriented and in no distress.\par Voice was clear.\par \par Face:\par The patient had no facial asymmetry or mass.\par The skin was unremarkable.\par \par Ears:\par The external ears were normal without deformity.\par The ear canals were clear.\par The tympanic membranes were intact and normal.\par \par Oral cavity:\par The oral mucosa was normal.\par The oral and base of tongue were clear and without mass.\par The gingival and buccal mucosa were moist and without lesions.\par The palate moved well.\par There was no cleft to the palate.\par There appeared to be good salivary flow.  \par There was no pus, erythema or mass in the oral cavity.\par \par Neuro:\par Neurologically, the patient was awake, alert, and oriented to person, place and time. There were no obvious focal neurologic abnormalities.  Cranial nerves II through XII were grossly intact.\par \par Nose: \par The external nose had no significant deformity.  There was no facial tenderness.  On anterior rhinoscopy, the nasal mucosa was clear.  The anterior septum was midline.  There were no visualized polyps purulence  or masses.\par \par She has a positive left-sided Rc maneuver\par \par  [de-identified] : The nasalNasal endoscopy: \par CPT 72274\par Procedure Note:\par \par Endoscopy was done with Covid precautions and with video. All risks and benefits were discussed with the patient and consent obtained.\par \par Nasal endoscopy was done with topical anesthesia of Pontocaine and Afrin and a      nasal endoscope.\par Indication: Nasal congestion, rule out sinusitis.\par Procedure: The nasal cavity was anesthetized with topical Afrin and Pontocaine. An  endoscope was used and inserted into the nasal cavity.\par Attention was first paid to the anterior nasal cavity.\par Endocoscopy was performed to inspect the interior of the nasal cavity, the nasal septum,  the middle and superior meati, the inferior, middle and superior turbinates, and the spheno-ethmoidal  recesses, the nasopharynx and eustachian tube orifices bilaterally. including the nasal mocosa, the possibility of polyps and the consistency of the nasal mucous.\par All findings were normal except:\par Because it is somewhat boggy.  She has inferior turbinate hypertrophy more on the left than on the right.  She has tightness of the left nasal valve and there is a slightly prominent graft from her previous reconstruction from trauma of the left nasal valve.\par \par She has a strongly positive left Tipton maneuver

## 2023-07-20 NOTE — ASSESSMENT
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  She had this treated previously and she was better for a couple of years but now this is worse again.  That probably from the inferior turbinate becoming congested again and at this point I recommended a trial of fluticasone but if she is not doing well enough I would plan left-sided Vivaer and inferior turbinate reduction.  She has a prominent dome graft and I would not suggest removing this but perhaps scoring this intranasally would be effective for the prominence.\par \par Her reflux is improving with Prevacid and I suggested continuing.\par She is concerned that she may have sleep apnea.  She is snoring and has a strong family history and I suggested a home sleep study and would like to see her back in follow-up afterwards

## 2023-07-24 RX ORDER — FLUTICASONE PROPIONATE 50 UG/1
50 SPRAY, METERED NASAL
Qty: 48 | Refills: 3 | Status: ACTIVE | COMMUNITY
Start: 2023-07-24 | End: 1900-01-01

## 2023-08-09 ENCOUNTER — OUTPATIENT (OUTPATIENT)
Dept: OUTPATIENT SERVICES | Facility: HOSPITAL | Age: 48
LOS: 1 days | End: 2023-08-09
Payer: COMMERCIAL

## 2023-08-09 ENCOUNTER — APPOINTMENT (OUTPATIENT)
Dept: SLEEP CENTER | Facility: HOME HEALTH | Age: 48
End: 2023-08-09
Payer: COMMERCIAL

## 2023-08-09 ENCOUNTER — APPOINTMENT (OUTPATIENT)
Dept: OTOLARYNGOLOGY | Facility: CLINIC | Age: 48
End: 2023-08-09

## 2023-08-09 PROCEDURE — 95800 SLP STDY UNATTENDED: CPT | Mod: 26

## 2023-08-09 PROCEDURE — 95800 SLP STDY UNATTENDED: CPT

## 2023-08-10 DIAGNOSIS — G47.33 OBSTRUCTIVE SLEEP APNEA (ADULT) (PEDIATRIC): ICD-10-CM

## 2023-08-15 ENCOUNTER — APPOINTMENT (OUTPATIENT)
Dept: OTOLARYNGOLOGY | Facility: CLINIC | Age: 48
End: 2023-08-15
Payer: COMMERCIAL

## 2023-08-15 PROCEDURE — 31231 NASAL ENDOSCOPY DX: CPT

## 2023-08-15 PROCEDURE — 99214 OFFICE O/P EST MOD 30 MIN: CPT | Mod: 25

## 2023-08-15 NOTE — PHYSICAL EXAM
[FreeTextEntry1] : General: The patient was alert and oriented and in no distress. Voice was clear.  Face: The patient had no facial asymmetry or mass. The skin was unremarkable.  Neck:  The neck was symmetrical. The parotid and submandibular glands were normal without masses. The trachea was midline and there was no unusual crepitus. The thyroid was smooth and nontender and no masses were palpated. There was no significant cervical adenopathy.  Nasal endoscopy:  CPT 34552 Procedure Note:  Endoscopy was done with Covid precautions and with video. All risks and benefits were discussed with the patient and consent obtained.  Nasal endoscopy was done with topical anesthesia of Pontocaine and Afrin and a      nasal endoscope. Indication: Nasal congestion, rule out sinusitis. Procedure: The nasal cavity was anesthetized with topical Afrin and Pontocaine. An  endoscope was used and inserted into the nasal cavity. Attention was first paid to the anterior nasal cavity. Endocoscopy was performed to inspect the interior of the nasal cavity, the nasal septum,  the middle and superior meati, the inferior, middle and superior turbinates, and the spheno-ethmoidal  recesses, the nasopharynx and eustachian tube orifices bilaterally. including the nasal mocosa, the possibility of polyps and the consistency of the nasal mucous. All findings were normal except:  The nasal mucosa is somewhat boggy.  She has a somewhat oversized left nasal valve graft with compression of the airway on that side  She has left-sided greater than right-sided inferior turbinate hypertrophy

## 2023-08-15 NOTE — ASSESSMENT
[FreeTextEntry1] : It was my impression that she was doing better with treatment for reflux and going back to nasal steroids.  She has the left-sided nasal valve stenosis and some of this is from her previous graft.  The options were again reviewed and we will do nasal valve remodeling with Vivaer and inferior turbinate reduction.  In the past she had some improvement with Kenalog injection into the graft but if this fails I would probably recommend scoring the graft to keep it in place but also to lessen its prominence. She had a home sleep study last week but I do not have the results yet.

## 2023-08-15 NOTE — HISTORY OF PRESENT ILLNESS
[de-identified] : TEO SEXTON was seen in follow-up on August 15.  She had some improvement going back to PPI therapy and using Flonase.  She still has the left-sided nasal obstruction and comes in for repeat evaluation.  The patient had no other ear nose or throat complaints at this visit.

## 2023-08-23 ENCOUNTER — APPOINTMENT (OUTPATIENT)
Dept: OTOLARYNGOLOGY | Facility: CLINIC | Age: 48
End: 2023-08-23
Payer: COMMERCIAL

## 2023-08-23 DIAGNOSIS — R06.83 SNORING: ICD-10-CM

## 2023-08-23 PROCEDURE — 30469Z: CUSTOM | Mod: 59

## 2023-08-23 PROCEDURE — 99213 OFFICE O/P EST LOW 20 MIN: CPT | Mod: 24

## 2023-08-23 PROCEDURE — 30801 ABLATE INF TURBINATE SUPERF: CPT

## 2023-08-23 NOTE — CONSULT LETTER
[FreeTextEntry2] : REYNALDO HUSAIN [FreeTextEntry1] :   Dear  Dr. REYNALDO HUSAIN,  I had the pleasure of seeing your patient today.   Please see my note below.   Thank you very much for allowing me to participate in the care of your patient.  Sincerely,  Shant DE PAZ [facility]  [facility]

## 2023-08-23 NOTE — HISTORY OF PRESENT ILLNESS
[de-identified] : TEO SEXTON was seen in follow-up on August 23.  She was here both for review of her home sleep study and also for nasal valve remodeling with inferior turbinate reductions.  She had the previous nasal surgery for repair of her nasal fracture and that has left her with a primarily left sided  nasal valve collapse but with too large a Valvular graft externally  She has inferior turbinate hypertrophy.  It was felt that she would benefit from nasal valve remodeling rather than removing this cartilage which would probably leave a visible defect.  I explained this to her and if this is not effective I would plan scoring the cartilage to lessen the prominence.  All risks benefits and alternatives were discussed with the patient. Additionally, we reviewed her sleep study.  She had an apnea-hypopnea index of 4% of 2.1 an hour and she had a lowest saturation of 90%.  I explained that while she may have snoring this is not significant sleep apnea.

## 2023-09-12 ENCOUNTER — APPOINTMENT (OUTPATIENT)
Dept: OTOLARYNGOLOGY | Facility: CLINIC | Age: 48
End: 2023-09-12
Payer: COMMERCIAL

## 2023-09-12 DIAGNOSIS — L90.5 SCAR CONDITIONS AND FIBROSIS OF SKIN: ICD-10-CM

## 2023-09-12 PROCEDURE — 31237 NSL/SINS NDSC SURG BX POLYPC: CPT | Mod: 50

## 2023-10-04 ENCOUNTER — APPOINTMENT (OUTPATIENT)
Dept: OTOLARYNGOLOGY | Facility: CLINIC | Age: 48
End: 2023-10-04
Payer: COMMERCIAL

## 2023-10-04 VITALS — HEIGHT: 64 IN | BODY MASS INDEX: 28.85 KG/M2 | WEIGHT: 169 LBS

## 2023-10-04 PROCEDURE — 99213 OFFICE O/P EST LOW 20 MIN: CPT | Mod: 25

## 2023-10-04 PROCEDURE — 31231 NASAL ENDOSCOPY DX: CPT

## 2023-10-04 RX ORDER — VORTIOXETINE 20 MG/1
TABLET, FILM COATED ORAL
Refills: 0 | Status: ACTIVE | COMMUNITY

## 2024-01-03 ENCOUNTER — APPOINTMENT (OUTPATIENT)
Dept: OTOLARYNGOLOGY | Facility: CLINIC | Age: 49
End: 2024-01-03

## 2024-02-08 ENCOUNTER — APPOINTMENT (OUTPATIENT)
Dept: OTOLARYNGOLOGY | Facility: CLINIC | Age: 49
End: 2024-02-08
Payer: COMMERCIAL

## 2024-02-08 PROCEDURE — 99214 OFFICE O/P EST MOD 30 MIN: CPT

## 2024-02-08 NOTE — PHYSICAL EXAM
[FreeTextEntry1] : General: The patient was alert and oriented and in no distress. Voice was clear.  Face: The patient had no facial asymmetry or mass. The skin was unremarkable.   Nose:\She has an improvement in the left nasal airway.  The left nasal collapse is less and not collapse statically but she does have the positive Rc maneuver bilaterally.

## 2024-02-08 NOTE — ASSESSMENT
[FreeTextEntry1] : It was my impression that she has a nasal valve collapse and has had some incomplete improvement on the left.  She finds that the right side is bothering her as well and would like the same procedure on the right and revision of the left.  All risks and benefits were discussed with the patient including the fact that this would be noncosmetic.  She understands and agrees I would recommend submucosal resection of the the inferior turbinates bilaterally as well.  With revision on the left

## 2024-02-08 NOTE — HISTORY OF PRESENT ILLNESS
[de-identified] : TEO ANNA SEXTON was seen in follow-up on February 8.  She is a little more than 6 months status post left nasal valve remodeling.  She notes that this has helped her quite a bit but she still has the nasal valve collapse to some extent on the left.  She feels that the right side has similar findings.

## 2024-02-20 ENCOUNTER — APPOINTMENT (OUTPATIENT)
Dept: OTOLARYNGOLOGY | Facility: CLINIC | Age: 49
End: 2024-02-20

## 2024-02-21 ENCOUNTER — APPOINTMENT (OUTPATIENT)
Dept: OTOLARYNGOLOGY | Facility: CLINIC | Age: 49
End: 2024-02-21

## 2024-04-10 ENCOUNTER — APPOINTMENT (OUTPATIENT)
Dept: OTOLARYNGOLOGY | Facility: CLINIC | Age: 49
End: 2024-04-10
Payer: COMMERCIAL

## 2024-04-10 DIAGNOSIS — R09.81 NASAL CONGESTION: ICD-10-CM

## 2024-04-10 DIAGNOSIS — J34.3 HYPERTROPHY OF NASAL TURBINATES: ICD-10-CM

## 2024-04-10 PROCEDURE — 30117 REMOVAL OF INTRANASAL LESION: CPT | Mod: 59

## 2024-04-10 PROCEDURE — 30469Z: CUSTOM

## 2024-04-10 PROCEDURE — 30802 ABLATE INF TURBINATE SUBMUC: CPT

## 2024-04-11 NOTE — PHYSICAL EXAM
[FreeTextEntry1] : PRE-OP DIAGNOSES:  Primary CyjhqsczsfI62.81 (nasal congestion)oJ34.89 (other disorders of the nose)  J34.9 (unspecified disorders of the nose or sinuses)oJ34.3 (turbinate hypertrophy) PROCEDURES PERFORMED: 1.Destruction of intranasal tissue by radiofrequency, internal approach 2.Destruction of intranasal tissue by radiofrequency, internal approach ([id number])3.Ablation,   soft   tissue   of   inferior   turbinates,   unilateral   or   bilateral,  byradiofrequency; superficial (CPT 97842)  nasal valve repair bilateral  02815 ANESTHESIA  Spray  1:1 Oxymetazoline and 4% tetracaine (5-10 min); Soaked cottonballs with 1:1 4% Lidocaine and 1:100,000 Epinephrine (10-15 Minutes); Inject 1cc1% Lidocaine at the caudal border of the upper lateral cartilage, along the medialedge of the inferior turbinate, and at the superior portion of the nasal septum. (10minutes)COMPLICATIONS:None ESTIMATED BLOOD LOSS:None  SPECIMENS:NoneINDICATIONS FOR PROCEDURE:The patient has findings Symptoms consistent with submucosalnodular   swelling   (swell   bodies)   of   the   septum   and   turbinate   hypertrophycontributing to nasal airway obstruction. After discussing the risks, benefits andalternatives to nasal airway treatment, the patient agreed to proceed with thetreatment.XMT765.A DESCRIPTION OF OPERATION:After obtaining informed consent from the patient,  he/she  was brought to theprocedure room and seated on the procedure chair in the seated reclined position.Two methods were used to verify patient identity.  A time-out was taken as perprotocol.  Topical decongestant was applied bilaterally intranasally.  A  zero   degrXmm rigid endoscope was used to examine the left and right nasal cavities. Thenasal   septum,   inferior   turbinates,   and   nasal   valve   areas   were   examined   forabnormalities.Starting with the  left/right  side,  soaked cotton balls with 1:1 4% Lidocaine and1:100,000 Epinephrine (10-15 Minutes) were placed in the nasal cavity adjacent tothe inferior turbinates, nasal septum, and nasal lateral wall. These were left in placefor 10 minutes and then removed without difficulty and disposed appropriately. Onecc of 1% Lidocaine solution was then injected at the caudal border of the upperlateral cartilage, along the medial edge of the inferior turbinate, and at the superiorportion of the nasal septum.Once appropriate anesthesia was confirmed, the radiofrequency stylus was preppedwith conductive media. The stylus was advanced into the left nostril and placed onthe middle portion of the caudal edge of the upper lateral cartilage. While exertingoutward lateral pressure, energy was then applied to the mid valve region, then tothe upper valve region, then to the lower valve region. Energy was applied at eachof the three sites to modify abnormal tissue that was obstructing airflow. The  radiofrequency stylus was removed from the nostril and inspected and re-prepped. The radiofrequency stylus was then inserted back into the nostril andplaced against the anterior superior septal region in the area where the submucosalnodular swelling was located. Medial pressure was exerted while energy wasapplied. Energy was applied at 2 sites to destruct the nasal septal lesions.The radiofrequency stylus was then removed from the nostril, inspected, and re-prepped. The radiofrequency stylus was then inserted back into the nostril andplaced  against the  medial  surface  of  the left inferior turbinate. Using lateraldeflection against the treatment zone, the radiofrequency stylus was activated toablate the hypertrophic turbinate tissue. The radiofrequency stylus was applied twomore times in adjacent, non-overlapping locations along the inferior turbinate foreach treatment cycle.Attention was then turned to the contralateral side and anesthesia was confirmed.The radiofrequency stylus was inspected, re-prepped, and the procedure performedon the left side was repeated. The stylus was advanced into the right nostril and placed on the middle portion ofthe caudal edge of the upper lateral cartilage. While exerting outward lateralpressure, energy was then applied to the mid valve region, then to the upper valveregion, then to the lower valve region. Energy was applied at each of the three sitesto modify abnormal tissue that was obstructing airflow. The  radiofrequency stylus was removed from the nostril and inspected and re-INW970.A prepped. The radiofrequency stylus was then inserted back into the nostril andplaced against the anterior superior septal region in the area where the submucosalnodular swelling was located. Medial pressure was exerted while energy wasapplied. Energy was applied at 2 sites to destruct the nasal septal lesions.The radiofrequency stylus was then removed from the nostril, inspected, and re-prepped. The radiofrequency stylus was then inserted back into the nostril andplaced  against the  medial  surface  of  the left inferior turbinate. Using lateraldeflection against the treatment zone, the radiofrequency stylus was activated toablate the hypertrophic turbinate tissue. The radiofrequency stylus was applied twomore times in adjacent, non-overlapping locations along the inferior turbinate foreach treatment cycle.The patient tolerated the procedures without any complications.After adequaterecovery time, a post-procedure assessment was completed and the patient wasreturned safely to ambulatory status as per protocol. Post-procedure instructionswere provided to patient's surrogate and the patient; these were understood and acopy provided prior to discharge. Patient to return to clinic in 14 days or sooner asneeded.ESD350.ACan you check to see if I did this correctly

## 2024-04-11 NOTE — REASON FOR VISIT
[Procedure Visit: _____] : [unfilled]  [FreeTextEntry2] : Nasal valve remodeling and submucosal resection of inferior turbinates

## 2024-04-11 NOTE — ASSESSMENT
[FreeTextEntry1] : TEO SEXTON Had an uneventful nasal valve revision by radiofrequency as well as inferior turbinate reductions and ablation of lesions.  There were no complications.  I placed her on topical ointment and then she will start nasal steroids later in the week and I will see her back in follow-up in 2 weeks or as needed.

## 2024-04-11 NOTE — HISTORY OF PRESENT ILLNESS
[de-identified] : Was seen on April 10, 2024.  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  She has compensatory inferior turbinate hypertrophy.  She had some improvement with the previous procedure and wanted to do this again.  All risks benefits and alternatives were discussed with the patient who understood and agreed.

## 2024-04-24 ENCOUNTER — APPOINTMENT (OUTPATIENT)
Dept: OTOLARYNGOLOGY | Facility: CLINIC | Age: 49
End: 2024-04-24
Payer: COMMERCIAL

## 2024-04-24 DIAGNOSIS — J32.9 CHRONIC SINUSITIS, UNSPECIFIED: ICD-10-CM

## 2024-04-24 DIAGNOSIS — K21.9 GASTRO-ESOPHAGEAL REFLUX DISEASE W/OUT ESOPHAGITIS: ICD-10-CM

## 2024-04-24 DIAGNOSIS — J31.0 CHRONIC RHINITIS: ICD-10-CM

## 2024-04-24 PROCEDURE — 99213 OFFICE O/P EST LOW 20 MIN: CPT | Mod: 24,25

## 2024-04-24 RX ORDER — LANSOPRAZOLE 30 MG/1
30 CAPSULE, DELAYED RELEASE ORAL
Qty: 90 | Refills: 1 | Status: ACTIVE | COMMUNITY
Start: 2024-04-24 | End: 1900-01-01

## 2024-04-24 NOTE — HISTORY OF PRESENT ILLNESS
[de-identified] : TEO SEXTON was seen on April 24.  She is 2 weeks status post nasal valve remodeling and inferior turbinate reductions and comes in for first postoperative cleaning.  She is doing well and notes that she does have some crusting.  She is also complaining about the feeling of mucus in her throat but not coming from her nose which has been clear.  The patient had no other ear nose or throat complaints at this visit.

## 2024-04-24 NOTE — ASSESSMENT
[FreeTextEntry1] : It was my impression that her throat symptoms were consistent with reflux.  I did not see any sinus disease.  I recommended being careful of her diet using alkaline water.  She has been using 15 mg of lansoprazole and I recommended switching to 30. I recommended topical moisturizing for the nose and going back to fluticasone and would like to see her back in follow-up in another 3 to 4 weeks. She appears to be having a good result

## 2024-04-24 NOTE — CONSULT LETTER
[FreeTextEntry2] : REYNALDO HUSAIN [FreeTextEntry1] :   Dear  Dr. REYNALDO HUSAIN,  I had the pleasure of seeing your patient today.   Please see my note below.   Thank you very much for allowing me to participate in the care of your patient.  Sincerely,  Shant DE PAZ [facility]  [facility]

## 2024-05-15 ENCOUNTER — APPOINTMENT (OUTPATIENT)
Dept: OTOLARYNGOLOGY | Facility: CLINIC | Age: 49
End: 2024-05-15

## 2024-06-18 ENCOUNTER — APPOINTMENT (OUTPATIENT)
Dept: OTOLARYNGOLOGY | Facility: CLINIC | Age: 49
End: 2024-06-18
Payer: COMMERCIAL

## 2024-06-18 VITALS — WEIGHT: 169 LBS | HEIGHT: 64 IN | BODY MASS INDEX: 28.85 KG/M2

## 2024-06-18 DIAGNOSIS — J34.89 OTHER SPECIFIED DISORDERS OF NOSE AND NASAL SINUSES: ICD-10-CM

## 2024-06-18 DIAGNOSIS — J34.9 UNSPECIFIED DISORDER OF NOSE AND NASAL SINUSES: ICD-10-CM

## 2024-06-18 DIAGNOSIS — M95.0 ACQUIRED DEFORMITY OF NOSE: ICD-10-CM

## 2024-06-18 PROCEDURE — 99024 POSTOP FOLLOW-UP VISIT: CPT

## 2024-06-18 PROCEDURE — 31231 NASAL ENDOSCOPY DX: CPT | Mod: 58

## 2024-06-18 PROCEDURE — 99212 OFFICE O/P EST SF 10 MIN: CPT | Mod: 25

## 2024-06-18 NOTE — ASSESSMENT
[FreeTextEntry1] : It was my impression that she was doing quite well and had significant improvement in her airway and her nasal valve.  I expect this to improve further and just recommended nasal saline and would like to see her back in follow-up 1 more time in about 3 months

## 2024-06-18 NOTE — PHYSICAL EXAM
[FreeTextEntry1] : General: The patient was alert and oriented and in no distress. Voice was clear.  Face: The patient had no facial asymmetry or mass. The skin was unremarkable.  Oral cavity: The oral mucosa was normal. The oral and base of tongue were clear and without mass. The gingival and buccal mucosa were moist and without lesions. The palate moved well. There was no cleft to the palate. There appeared to be good salivary flow.   There was no pus, erythema or mass in the oral cavity.  Nasal endoscopy:  CPT 54123 Procedure Note:  Endoscopy was done with Covid precautions and with video. All risks and benefits were discussed with the patient and consent obtained.  Nasal endoscopy was done with topical anesthesia of Pontocaine and Afrin and a      nasal endoscope. Indication: Evaluation of response to surgery Procedure: The nasal cavity was anesthetized with topical Afrin and Pontocaine. An  endoscope was used and inserted into the nasal cavity. Attention was first paid to the anterior nasal cavity. Endocoscopy was performed to inspect the interior of the nasal cavity, the nasal septum,  the middle and superior meati, the inferior, middle and superior turbinates, and the spheno-ethmoidal  recesses, the nasopharynx and eustachian tube orifices bilaterally. including the nasal mocosa, the possibility of polyps and the consistency of the nasal mucous. All findings were normal except:  She had significant improvement in her nasal valve collapse and in the area of fullness of the left upper lateral cartilage  There is no significant crusting

## 2024-06-18 NOTE — HISTORY OF PRESENT ILLNESS
[de-identified] : TEO ANNAMARY SEXTON Was seen in follow-up on June 18.  She is doing quite well.  She notes a significant improvement in her airway and diminished nasal valve collapse as well as a less fullness of the supratip.  She comes in for repeat evaluation

## 2024-06-18 NOTE — CONSULT LETTER
[FreeTextEntry2] : WAYNE SHELLEY [FreeTextEntry1] :   Dear  Dr. WAYNE SHELLEY,  I had the pleasure of seeing your patient today.   Please see my note below.   Thank you very much for allowing me to participate in the care of your patient.  Sincerely,  Shant Rachel MD [facility]  [facility]

## 2024-12-11 ENCOUNTER — NON-APPOINTMENT (OUTPATIENT)
Age: 49
End: 2024-12-11

## 2024-12-11 ENCOUNTER — APPOINTMENT (OUTPATIENT)
Dept: OTOLARYNGOLOGY | Facility: CLINIC | Age: 49
End: 2024-12-11
Payer: COMMERCIAL

## 2024-12-11 DIAGNOSIS — J31.0 CHRONIC RHINITIS: ICD-10-CM

## 2024-12-11 DIAGNOSIS — J34.829 NASAL VALVE COLLAPSE, UNSPECIFIED: ICD-10-CM

## 2024-12-11 DIAGNOSIS — J34.89 OTHER SPECIFIED DISORDERS OF NOSE AND NASAL SINUSES: ICD-10-CM

## 2024-12-11 PROCEDURE — 99214 OFFICE O/P EST MOD 30 MIN: CPT

## 2025-01-08 ENCOUNTER — APPOINTMENT (OUTPATIENT)
Dept: OTOLARYNGOLOGY | Facility: CLINIC | Age: 50
End: 2025-01-08

## 2025-02-12 ENCOUNTER — APPOINTMENT (OUTPATIENT)
Dept: OTOLARYNGOLOGY | Facility: CLINIC | Age: 50
End: 2025-02-12
Payer: COMMERCIAL

## 2025-02-12 ENCOUNTER — NON-APPOINTMENT (OUTPATIENT)
Age: 50
End: 2025-02-12

## 2025-02-12 DIAGNOSIS — J34.3 HYPERTROPHY OF NASAL TURBINATES: ICD-10-CM

## 2025-02-12 DIAGNOSIS — J34.89 OTHER SPECIFIED DISORDERS OF NOSE AND NASAL SINUSES: ICD-10-CM

## 2025-02-12 DIAGNOSIS — J34.829 NASAL VALVE COLLAPSE, UNSPECIFIED: ICD-10-CM

## 2025-02-12 DIAGNOSIS — J32.9 CHRONIC SINUSITIS, UNSPECIFIED: ICD-10-CM

## 2025-02-12 PROCEDURE — 30469Z: CUSTOM

## 2025-02-12 PROCEDURE — 30802 ABLATE INF TURBINATE SUBMUC: CPT

## 2025-03-04 ENCOUNTER — APPOINTMENT (OUTPATIENT)
Dept: OTOLARYNGOLOGY | Facility: CLINIC | Age: 50
End: 2025-03-04

## 2025-03-19 ENCOUNTER — APPOINTMENT (OUTPATIENT)
Dept: OTOLARYNGOLOGY | Facility: CLINIC | Age: 50
End: 2025-03-19
Payer: COMMERCIAL

## 2025-03-19 DIAGNOSIS — J31.0 CHRONIC RHINITIS: ICD-10-CM

## 2025-03-19 DIAGNOSIS — J34.829 NASAL VALVE COLLAPSE, UNSPECIFIED: ICD-10-CM

## 2025-03-19 DIAGNOSIS — J34.89 OTHER SPECIFIED DISORDERS OF NOSE AND NASAL SINUSES: ICD-10-CM

## 2025-03-19 DIAGNOSIS — J34.3 HYPERTROPHY OF NASAL TURBINATES: ICD-10-CM

## 2025-03-19 PROCEDURE — 99024 POSTOP FOLLOW-UP VISIT: CPT
